# Patient Record
Sex: MALE | Race: WHITE
[De-identification: names, ages, dates, MRNs, and addresses within clinical notes are randomized per-mention and may not be internally consistent; named-entity substitution may affect disease eponyms.]

---

## 2017-11-25 ENCOUNTER — HOSPITAL ENCOUNTER (EMERGENCY)
Dept: HOSPITAL 31 - C.ER | Age: 22
Discharge: HOME | End: 2017-11-25
Payer: COMMERCIAL

## 2017-11-25 VITALS — SYSTOLIC BLOOD PRESSURE: 128 MMHG | HEART RATE: 67 BPM | DIASTOLIC BLOOD PRESSURE: 66 MMHG

## 2017-11-25 VITALS — RESPIRATION RATE: 18 BRPM | TEMPERATURE: 98.1 F | OXYGEN SATURATION: 97 %

## 2017-11-25 DIAGNOSIS — S70.12XA: ICD-10-CM
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
## 2017-11-25 DIAGNOSIS — S09.90XA: Primary | ICD-10-CM
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
## 2018-07-19 ENCOUNTER — HOSPITAL ENCOUNTER (EMERGENCY)
Dept: HOSPITAL 31 - C.ER | Age: 23
Discharge: HOME | End: 2018-07-19
Payer: COMMERCIAL

## 2018-07-19 VITALS
DIASTOLIC BLOOD PRESSURE: 72 MMHG | RESPIRATION RATE: 16 BRPM | HEART RATE: 108 BPM | SYSTOLIC BLOOD PRESSURE: 114 MMHG | TEMPERATURE: 98.5 F

## 2018-07-19 VITALS — OXYGEN SATURATION: 96 %

## 2018-07-19 DIAGNOSIS — K52.9: Primary | ICD-10-CM

## 2018-07-19 LAB
ALBUMIN SERPL-MCNC: 4.7 G/DL (ref 3.5–5)
ALBUMIN/GLOB SERPL: 1.2 {RATIO} (ref 1–2.1)
ALT SERPL-CCNC: 64 U/L (ref 21–72)
APTT BLD: 34 SECONDS (ref 21–34)
AST SERPL-CCNC: 42 U/L (ref 17–59)
BASOPHILS # BLD AUTO: 0.1 K/UL (ref 0–0.2)
BASOPHILS NFR BLD: 0.4 % (ref 0–2)
BILIRUB UR-MCNC: NEGATIVE MG/DL
BUN SERPL-MCNC: 19 MG/DL (ref 9–20)
CALCIUM SERPL-MCNC: 10 MG/DL (ref 8.6–10.4)
EOSINOPHIL # BLD AUTO: 0.2 K/UL (ref 0–0.7)
EOSINOPHIL NFR BLD: 1.3 % (ref 0–4)
EOSINOPHIL NFR BLD: 4 % (ref 0–4)
ERYTHROCYTE [DISTWIDTH] IN BLOOD BY AUTOMATED COUNT: 13.9 % (ref 11.5–14.5)
GFR NON-AFRICAN AMERICAN: > 60
GLUCOSE UR STRIP-MCNC: NORMAL MG/DL
HGB BLD-MCNC: 16 G/DL (ref 12–18)
INR PPP: 1.1
LEUKOCYTE ESTERASE UR-ACNC: (no result) LEU/UL
LIPASE: 67 U/L (ref 23–300)
LYMPHOCYTE: 7 % (ref 20–40)
LYMPHOCYTES # BLD AUTO: 0.8 K/UL (ref 1–4.3)
LYMPHOCYTES NFR BLD AUTO: 6.7 % (ref 20–40)
MCH RBC QN AUTO: 29.5 PG (ref 27–31)
MCHC RBC AUTO-ENTMCNC: 34.1 G/DL (ref 33–37)
MCV RBC AUTO: 86.4 FL (ref 80–94)
MONOCYTE: 5 % (ref 0–10)
MONOCYTES # BLD: 0.6 K/UL (ref 0–0.8)
MONOCYTES NFR BLD: 5 % (ref 0–10)
NEUTROPHILS # BLD: 11 K/UL (ref 1.8–7)
NEUTROPHILS NFR BLD AUTO: 84 % (ref 50–75)
NEUTROPHILS NFR BLD AUTO: 86.6 % (ref 50–75)
NRBC BLD AUTO-RTO: 0 % (ref 0–2)
PH UR STRIP: 5 [PH] (ref 5–8)
PLATELET # BLD EST: NORMAL 10*3/UL
PLATELET # BLD: 353 K/UL (ref 130–400)
PMV BLD AUTO: 7.4 FL (ref 7.2–11.7)
PROT UR STRIP-MCNC: NEGATIVE MG/DL
PROTHROMBIN TIME: 12.3 SECONDS (ref 9.7–12.2)
RBC # BLD AUTO: 5.42 MIL/UL (ref 4.4–5.9)
RBC # UR STRIP: (no result) /UL
RBC MORPH BLD: NORMAL
SP GR UR STRIP: 1.02 (ref 1–1.03)
TOTAL CELLS COUNTED BLD: 100
UROBILINOGEN UR-MCNC: NORMAL MG/DL (ref 0.2–1)
WBC # BLD AUTO: 12.7 K/UL (ref 4.8–10.8)

## 2018-07-19 PROCEDURE — 83690 ASSAY OF LIPASE: CPT

## 2018-07-19 PROCEDURE — 85025 COMPLETE CBC W/AUTO DIFF WBC: CPT

## 2018-07-19 PROCEDURE — 96375 TX/PRO/DX INJ NEW DRUG ADDON: CPT

## 2018-07-19 PROCEDURE — 85730 THROMBOPLASTIN TIME PARTIAL: CPT

## 2018-07-19 PROCEDURE — 85610 PROTHROMBIN TIME: CPT

## 2018-07-19 PROCEDURE — 74177 CT ABD & PELVIS W/CONTRAST: CPT

## 2018-07-19 PROCEDURE — 99285 EMERGENCY DEPT VISIT HI MDM: CPT

## 2018-07-19 PROCEDURE — 96374 THER/PROPH/DIAG INJ IV PUSH: CPT

## 2018-07-19 PROCEDURE — 80053 COMPREHEN METABOLIC PANEL: CPT

## 2018-07-19 PROCEDURE — 96361 HYDRATE IV INFUSION ADD-ON: CPT

## 2018-07-19 PROCEDURE — 81001 URINALYSIS AUTO W/SCOPE: CPT

## 2018-07-19 NOTE — CT
Date of service: 



07/19/2018



PROCEDURE:  CT Abdomen and Pelvis with contrast



HISTORY:

Abdominal pain, vomiting diarrhea



COMPARISON:

None.



TECHNIQUE:

CT scan of the abdomen and pelvis was performed after administration 

of intravenous contrast. Oral contrast was not administered.  Coronal 

and sagittal reformatted images were obtained. 



Contrast Contrast dose: 100 mL Visipaque 320



Radiation dose:



Total exam DLP = 1370.84 mGy-cm.



This CT exam was performed using one or more of the following dose 

reduction techniques: Automated exposure control, adjustment of the 

mA and/or kV according to patient size, and/or use of iterative 

reconstruction technique.



FINDINGS:



LOWER THORAX:

The visualized lungs are clear. 



LIVER:

The liver is normal in size and there is diffuse fatty infiltration. 

No gross lesion or ductal dilatation. 



GALLBLADDER AND BILE DUCTS:

There are no calcified gallstones. 



PANCREAS:

Normal in size with homogeneous enhancement. No gross lesion or 

ductal dilatation.



SPLEEN:

Mild splenomegaly. 



ADRENALS:

No discrete nodule. 



KIDNEYS AND URETERS:

Normal in size with homogeneous enhancement. No hydronephrosis. No 

solid mass. 



VASCULATURE:

No aortic aneurysm. 



BOWEL:

There are fluid-filled mildly dilated small bowel loops and fluid in 

the colon. No bowel obstruction.



APPENDIX:

Normal appendix. 



PERITONEUM:

No free fluid. No free air. 



LYMPH NODES:

No enlarged lymph nodes. 



BLADDER:

Unremarkable. 



REPRODUCTIVE:

The uterus is normal in size. 



BONES:

No acute fracture. Within normal limits for the patient's age. 



OTHER FINDINGS:

There is a small sliding hiatal hernia.



IMPRESSION:

No CT evidence for acute appendicitis.



Fluid-filled mildly dilated small bowel loops and fluid in the colon 

likely represents nonspecific infectious/ inflammatory enterocolitis. 

No bowel obstruction.



Fatty liver and mild splenomegaly. 



Small sliding hiatal hernia.

## 2018-07-19 NOTE — C.PDOC
History Of Present Illness


23-year-old male, presents to the emergency department with complaints of 

abdominal pain since last night. Patient has had nausea/vomiting and diarrhea. 

He denies fever. Pt had lasagna last night. No other complaints at this time.


Time Seen by Provider: 07/19/18 08:22


Chief Complaint (Nursing): Abdominal Pain


History Per: Patient


History/Exam Limitations: no limitations


Current Symptoms Are (Timing): Still Present





Past Medical History


Reviewed: Historical Data, Nursing Documentation, Vital Signs


Vital Signs: 


 Last Vital Signs











Temp  98.5 F   07/19/18 12:22


 


Pulse  108 H  07/19/18 12:22


 


Resp  16   07/19/18 12:22


 


BP  114/72   07/19/18 12:22


 


Pulse Ox  99   07/19/18 12:22














- Medical History


PMH: Asthma


Family History: States: No Known Family Hx





- Social History


Hx Tobacco Use: No


Hx Alcohol Use: Yes (Socially)


Hx Substance Use: No





- Immunization History


Hx Tetanus Toxoid Vaccination: No


Hx Influenza Vaccination: No


Hx Pneumococcal Vaccination: No





Review Of Systems


Constitutional: Negative for: Fever


Gastrointestinal: Positive for: Nausea, Vomiting, Abdominal Pain, Diarrhea





Physical Exam





- Physical Exam


Appears: Non-toxic, No Acute Distress


Skin: Normal Color, Warm, Dry, No Rash


Head: Atraumatic, Normacephalic


Eye(s): bilateral: Normal Inspection


Nose: Normal


Oral Mucosa: Moist


Lips: Normal Appearing


Neck: Normal ROM


Chest: Symmetrical


Cardiovascular: Rhythm Regular, No Murmur


Respiratory: Normal Breath Sounds, No Accessory Muscle Use


Gastrointestinal/Abdominal: Soft, Tenderness (mild non-focal ), No Guarding, No 

Rebound


Extremity: Normal ROM, No Deformity, No Swelling


Neurological/Psych: Oriented x3, Normal Speech, Normal Cognition





ED Course And Treatment





- Laboratory Results


Result Diagrams: 


 07/19/18 08:42





 07/19/18 08:42


O2 Sat by Pulse Oximetry: 96 (RA)


Pulse Ox Interpretation: Normal





Medical Decision Making


Medical Decision Making: 





ro colitis gastritis, appendicitis- labs imaging pending





pt reassesed paim improved. ct shows mild colitis. antibiotics given. pt states 

feels well for d.c 





Disposition





- Disposition


Referrals: 


[facility] [Outside]


[facility] [Outside]


Gary Isaacs MD [Staff Provider] - 


Disposition: HOME/ ROUTINE


Disposition Time: 12:00


Condition: STABLE


Additional Instructions: 


please follow up with your doctor/clinic. return to er with worsening symptoms 

or concerns. 


Prescriptions: 


Ciprofloxacin [Cipro] 500 mg PO BID #14 tab


metroNIDAZOLE [Flagyl] 500 mg PO TID #21 tab


Instructions:  Bacterial Gastroenteritis, Child (DC), Acute Abdomen (Belly Pain)


Forms:  [facility] (English)





- Clinical Impression


Clinical Impression: 


 Abdominal pain, Colitis








- Scribe Statement


The provider has reviewed the documentation as recorded by the Scribe (Selvin Roy)








All medical record entries made by the Scribe were at my direction and 

personally dictated by me. I have reviewed the chart and agree that the record 

accurately reflects my personal performance of the history, physical exam, 

medical decision making, and the department course for this patient. I have 

also personally directed, reviewed, and agree with the discharge instructions 

and disposition.
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
